# Patient Record
Sex: MALE | Race: WHITE | NOT HISPANIC OR LATINO | Employment: FULL TIME | URBAN - METROPOLITAN AREA
[De-identification: names, ages, dates, MRNs, and addresses within clinical notes are randomized per-mention and may not be internally consistent; named-entity substitution may affect disease eponyms.]

---

## 2018-01-23 ENCOUNTER — OFFICE VISIT (OUTPATIENT)
Dept: ORTHOPEDICS | Facility: CLINIC | Age: 33
End: 2018-01-23
Payer: COMMERCIAL

## 2018-01-23 ENCOUNTER — RADIANT APPOINTMENT (OUTPATIENT)
Dept: GENERAL RADIOLOGY | Facility: CLINIC | Age: 33
End: 2018-01-23
Attending: FAMILY MEDICINE
Payer: COMMERCIAL

## 2018-01-23 VITALS
HEIGHT: 66 IN | WEIGHT: 173.5 LBS | DIASTOLIC BLOOD PRESSURE: 92 MMHG | BODY MASS INDEX: 27.88 KG/M2 | HEART RATE: 114 BPM | SYSTOLIC BLOOD PRESSURE: 157 MMHG

## 2018-01-23 DIAGNOSIS — M54.5 ACUTE MIDLINE LOW BACK PAIN, WITH SCIATICA PRESENCE UNSPECIFIED: Primary | ICD-10-CM

## 2018-01-23 DIAGNOSIS — M54.5 ACUTE MIDLINE LOW BACK PAIN, WITH SCIATICA PRESENCE UNSPECIFIED: ICD-10-CM

## 2018-01-23 RX ORDER — DICLOFENAC SODIUM 75 MG/1
75 TABLET, DELAYED RELEASE ORAL 2 TIMES DAILY
Qty: 20 TABLET | Refills: 0 | Status: SHIPPED | OUTPATIENT
Start: 2018-01-23 | End: 2020-02-07

## 2018-01-23 NOTE — LETTER
Date:January 30, 2018      Patient was self referred, no letter generated. Do not send.        [facility] Health Information

## 2018-01-23 NOTE — LETTER
1/23/2018       RE: Gregory Gonsalez  [address]  [place] [address]     Dear Colleague,    Thank you for referring your patient, Gregory Gonsalez, to the [facility] SPORTS AND ORTHOPAEDIC WALK IN CLINIC at [facility]. Please see a copy of my visit note below.         NEW PATIENT INTAKE QUESTIONNAIRE  SPORTS & ORTHOPEDIC WALK-IN 1/23/2018    Primary Care Physician: None     Where are the majority of your medical records? NA    Reason for Visit:    What part of your body is injured / painful?  central low back    What caused the injury /pain? Aggravated while shoveling     How long ago did your injury occur or pain begin? yesterday    What are your most bothersome symptoms? Pain    How would you characterize your symptom? sharp and shooting    What makes your symptoms better? Nothing    What makes your symptoms worse? Standing, Walking, Movement and Bending    Have you been previously seen for this problem? NA    Medical History:    Medical History:     Have you had surgery on this body part before? No    Medications: Excedrin PRN    Allergies: No known drug allergies    Family History of Medical Problems:     Previous Surgeries: Tooth abstraction     Social History:    Occupation:  for [employer]     Handedness: Right    Exercise: Running     Review of Systems:    Have you recently had a a fever, chills, weight loss? No    Do you have any vision problems? No    Do you have any chest pain or edema? Yes, random/intermittent     Do you have any shortness of breath or wheezing?  Yes, Asthma     Do you have stomach problems? No    Do you have any numbness or focal weakness? Yes--occasionally around the right achilles     Do you have diabetes? No    Do you have problems with bleeding or clotting? No    Do you have an rashes or other skin lesions? No    Communication:    How did you hear about us? Insurance    Who else should know about this visit? Other:  NA           CHIEF COMPLAINT:  Consult (Low back )       HISTORY OF PRESENT ILLNESS  Mr. Gonsalez is a pleasant 32 year old year old male who presents to clinic today with low back pain.  Gregory explains that he was outside shoveling his driveway yesterday and experienced sharp pain of his low back.  He recalls lifting the heavy snow which was difficult for him.  His pain is described as sharp and shooting.  Location is in the central lumbar region.  Pain is worse with standing walking and movement of his low back.  It is improved by essentially nothing.  He has tried ibuprofen which is proved to be futile.  He has not had any previous back injuries or back pain.  Denies any surgeries of his low back.  Denies numbness or tingling of lower extremities.  Denies any neurologic weakness of lower extremities.    Additional history: as documented    MEDICAL HISTORY  There is no problem list on file for this patient.      Current Outpatient Prescriptions   Medication Sig Dispense Refill     Aspirin-Acetaminophen-Caffeine (EXCEDRIN PO) Take by mouth as needed       diclofenac (VOLTAREN) 75 MG EC tablet Take 1 tablet (75 mg) by mouth 2 times daily 20 tablet 0     tiZANidine (ZANAFLEX) 4 MG tablet Take 1 tablet (4 mg) by mouth 3 times daily 14 tablet 0       Allergies   Allergen Reactions     Mold      Pollen Extract      Seasonal Allergies        Medical History:    Medical History:     Have you had surgery on this body part before? No    Medications: Excedrin PRN    Allergies: No known drug allergies    Family History of Medical Problems:     Previous Surgeries: Tooth abstraction      Social History:    Occupation:  for [employer]     Handedness: Right    Exercise: Running    Additional medical/Social/Surgical histories reviewed in 2C2P and updated as appropriate.     REVIEW OF SYSTEMS (1/27/2018)  CONSTITUTIONAL: Denies fever and weight loss  EYES: Denies acute vision changes  ENT: Denies hearing changes or  "difficulty swallowing  CARDIAC: Denies chest pain or edema currently  RESPIRATORY: Denies dyspnea, cough or wheeze at this time. +asthma  GASTROINTESTINAL: Denies abdominal pain, nausea, vomiting  MUSCULOSKELETAL: See HPI  SKIN: Denies any recent rash or lesion  NEUROLOGICAL: Denies numbness or focal weakness  PSYCHIATRIC: No history of psychiatric symptoms or problems  ENDOCRINE: Denies current diagnosis of diabetes  HEMATOLOGY: Denies episodes of easy bleeding      PHYSICAL EXAM  BP (!) 157/92  Pulse 114  Ht 1.664 m (5' 5.5\")  Wt 78.7 kg (173 lb 8 oz)  BMI 28.43 kg/m2      General Appearance: Well appearing, alert, in no acute distress, well-hydrated, and well nourished  Cardiovascular: no signs of upper or lower extremity edema  Respiratory: no respiratory distress, no audible wheezing, no labored breathing, symmetric thoracic excursion  Psychiatric: mood and affect are appropriate, patient is oriented to time, place and person  General  - normal appearance, in no obvious distress  CV  - normal peripheral perfusion  Pulm  - normal respiratory pattern, non-labored  Musculoskeletal - lumbar spine  - stance: slow to rise and sit  - inspection: normal bone and joint alignment, no obvious scoliosis  - palpation: bilateral lumbar paravertebral spasm  - ROM: pain with bilateral rotation, flexion past 30 deg, extension  - strength: lower extremities 5/5 in all planes  - special tests:  (-) straight leg raise bilaterally  (-) slump test  Neuro  - patellar and Achilles DTRs 2+ bilaterally, no sensory or motor deficit, grossly normal coordination, normal muscle tone  Skin  - no ecchymosis, erythema, warmth, or induration, no obvious rash  Psych  - interactive, appropriate, normal mood and affect    IMAGING : XR lumbar 2v. Final results and radiologist's interpretation, available in the [facility] health record. Images were reviewed with the patient/family members in the office today. My personal interpretation of the " performed imaging is no acute osseous abnormality.     ASSESSMENT & PLAN  Mr. Gonsalez is a 32 year old year old male who presents to clinic today with central low back pain which occurred yesterday after shoveling heavy snow.  Low back pain likely secondary to muscle spasm vs. Disc bulge.  No worrisome neurologic findings or red flags.    Diagnosis:   Acute low back pain without radiculopathy    -Voltaren twice daily ×7 days  -Tizanidine 4 mg tablet nightly  -Home exercise program provided for stretching and light strengthening  -Physical therapy referral  -Discussed heat/ice to low back  -Follow up 2 weeks    It was a pleasure seeing Gregory today.    Peter Rosario DO, [facility]  Primary Care Sports Medicine      Again, thank you for allowing me to participate in the care of your patient.      Sincerely,    Peter Rosario DO

## 2018-01-23 NOTE — PATIENT INSTRUCTIONS
Low back pain     What is low back pain?    Low back pain is pain and stiffness in the lower back.  It is one of the most common reasons people miss work.      How does it occur?    Low back pain is usually caused when a ligament or muscle holding a vertebra in its proper position is strained.  Vertebrae are bones that make up the spinal column through which the spinal cord passes.  When these muscles or ligaments become weak, the spine loses its ability, resulting in pain.  Because nerves reach all parts of the body from the spinal cord, back problems can lead to pain or weakness in almost any part of the body.      Low back pain can occur if your job involves lifting and carrying heavy objects, or if you spend a lot of time sitting or standing in one position or bending over.  It can be caused by a fall or by unusually strenuous exercise.  It can be brought on by the tension and stress that causes headaches in some people.  It can even be brought on by violent sneezing or coughing.      People who are overweight may have low back pain because of the added stress on their back.      Back pain may occur when the muscles, joints, bones and connective tissues in the back become inflamed as a result of an infection or an immune system problem.  Arthritic disorders as well as some congenital and degenerative conditions may cause back pain.      Back pain accompanied by loss of bladder or bowel control, difficulty moving your legs, or numbness or tingling in your arms or legs may indicate an injury to your spine and nerves, which requires immediate medical treatment.      What are the symptoms?    Symptoms include:      Pain in the back or legs    Stiffness and limited motion    The pain may be continuous or may occur in certain positions.  It may be aggravated by coughing, sneezing, bending, twisting, or straining during a bowel movement. The pain may occur in only one spot or may spread to other areas, most commonly  down the buttocks and into the back of the thigh.     A low back strain typically does not produce pain past the knee into the calf or foot.  Tingling and numbness in the calf or foot may indicate a herniated disk or pinched nerve.      How is it diagnosed?    Your healthcare provider will review your medical history and examine you.  He or she may order X-rays.  In certain situations, a myelogram, CT scan, or MRI may be ordered.    How is it treated?    The early stages of back pain with muscle spasms should be treated with ice packs for 20-30 minutes every 4 to 6 hours for the first 2 to 3 days. You m ay lie on a frozen gel pack, crushed ice, or a bag of frozen peas.    The following are always to treat low back pain:      After the initial injury, applying heat from a heating pad or hot water bottle    Resting in bed on a firm mattress.  Often it helps to lie on your back with your knees raised.  However, isome people prefer to lie on their side with their knees bent.      Taking aspirin, ibuprofen, or other anti-inflammatory medications; muscle relaxants; or other pain medications if recommended by your healthcare provider (adults aged 65 years and older should not take non-steroidal anti-inflammatory medicine for more than 7 days without their healthcare provider's approval).    Having your back massaged by a trained person    Having traction, if recommended by your provider    Wearing a belt or corset to support your back    Talking with a counselor, if your back pain is related to tension caused by emotional problems.    Beginning a program of physical therapy, or exercising on your own.  Begin a regular exercise program to gently stretch and strengthen your muscles as soon as you can.  Your healthcare provider or physical therapist can recommend exercises that will not only help you feel better but will strengthen your muscles and help avoid back trouble later.      When the pain subsides, ask your  healthcare provider about starting an exercise program such as the following:       Exercise moderately every day, using stretching and warm-up exercises suggested by your provider or physical therapist.    Exercise vigorously for about 30 minutes two or three times a week by walking, swimming, using a stationary bicycle, or doing low-impact aerobics.    Participating regularly in an exercise program will not only help your back, it will also help keep you healthier overall.     How long will the effects last?      The effects of back pain last as long as the cause exists or until your body recovers from the strain, usually a day or two but sometimes weeks.     How can I take care of myself?    In addition to the treatment described above, keep in mind these suggestions:      Use an electric heating pad on a low setting (or a hot water bottle wrapped in a towel to avoid burning yourself) for 20 to 30 minutes.  Don't let the heating pad get too hot, and don't fall asleep with it.  You could get a burn.     Try putting an ice pack wrapped in a towel on your back for 20 minutes, one to four times a day.  Set an alarm to avoid frostbite from using the ice pack too long.    Put a pillow under your knees when you are lying down.    Sleep without a pillow under your head.    Lose weight if you are overweight.    Practice good posture.  Stand with your head up, shoulders straight and chest forward, weight balanced evenly on both feet, and pelvis tucked in.     Pain is the best way to  the pace you should set in increasing your activity and exercise.  Minor discomfort, stiffness, soreness, and mild aches need not interfere with activity.  However, limit your activity temporarily if:      your symptoms return    the pain increases when you are more active    the pain increases within 24 hours after a new or higher level of activity    When can I return to my sport or activity?    The goal of rehabilitation is to return  you to your sport or activity as soon as safely possible. If you return too soon you may worsen your injury, which could lead to permanent damage.  Everyone recovers from injury at different rate.  Return to your sport will be determined by how soon your back recovers, not by how many days or weeks it has been since your injury occurred.  In general, the longer you have symptoms before you start treatment, the longer it will take to get better.      It is important that you have fully recovered from your low back pain before you return to your sport or any strenuous activity.  You must be able to have the same range of motion that you had before your injury.  You must be able to run, jump and twist without pain.      What can I do to help prevent low back pain?    You can reduce the strain on your back by doing the following:      Don't push with your arms when you move a heavy object.  Turn around and push backwards so the strain is taken by your legs.     Whenever you sit, sit in a straight-backed chair and hold your spine against the back of the chair.     Bend your knees and hips and keep your back straight when you lift a heavy object.     Avoid lifting heavy objects higher than your waist    Hold packages you carry close to your body, with your arms bent.    Use a footrest for one foot when you stand or sit in one spot for a long time.  This keeps your back straight.    Bend your knees when you bend over.    Sit close to the pedals when you drive and use your seat belt and a hard backrest or pillow.     Lie on your side with your knees bent when you sleep or rest.  It may help to put a pillow between your knees.    Put a pillow under your knees when you sleep on your back.    Raise the foot of the bed 8 inches to discourage sleeping on your stomach unless you have other problems that require that you keep your head elevated.      To rest your back, hold each of these positions for 5 minutes or longer:  Lie on  your back, bend your knees, and put pillows under your knees.    Lie on your back, put a pillow under your neck, bend your knees to a 90-degree angle, and put your lower legs and feet on a chair.    Lie on your back, bend your knees, and bring one knee up to your chest and hold it there.  Repeat with the other knee, then bring both knees to your chest.  When holding your knee to your chest, grab your thigh rather than your lower leg to avoid over flexing your knee.           Exercises that stretch and strengthen the muscles of your abdomen and spine can help prevent back problems. Strong back and abdominal muscles help you keep good posture, with your spine in its correct position.    If your muscles are tight, take a warm shower or bath before doing the exercises. Exercise on a rug or mat. Wear loose clothing. Don t wear shoes. Stop doing any exercise that causes pain until you have talked with your healthcare provider.    Ask your provider or physical therapist to help you develop an exercise program. Ask your provider how many times a week you need to do the exercises. Remember to start slowly.    Excerpts from: The Sports Medicine Patient Advisor 3rd edition. Copyright 2010 [employer].     Low Back Pain Exercises    EXERCISES  These exercises are intended only as suggestions. Be sure to check with your provider before starting the exercises.    Standing hamstring stretch: Put the heel of one leg on a stool about 15 inches high. Keep your leg straight. Lean forward, bending at the hips until you feel a mild stretch in the back of your thigh. Make sure you do not roll your shoulders or bend at the waist when doing this. You want to stretch your leg, not your lower back. Hold the stretch for 15 to 30 seconds. Repeat with each leg 3 times.    Cat and camel: Get down on your hands and knees. Let your stomach sag, allowing your back to curve downward. Hold this position for 5 seconds. Then arch your back and  hold for 5 seconds. Do 2 sets of 15.    Quadruped arm and leg raise: Get down on your hands and knees. Pull in your belly button and tighten your abdominal muscles to stiffen your spine. While keeping your abdominals tight, raise one arm and the opposite leg away from you. Hold this position for 5 seconds. Lower your arm and leg slowly and change sides. Do this 10 times on each side.    Pelvic tilt: Lie on your back with your knees bent and your feet flat on the floor. Pull your belly button in towards your spine and push your lower back into the floor, flattening your back. Hold this position for 15 seconds, then relax. Repeat 5 to 10 times.  Partial curl: Lie on your back with your knees bent and your feet flat on the floor. Draw in your abdomen and tighten your stomach muscles. With your hands stretched out in front of you, curl your upper body forward until your shoulders clear the floor. Hold this position for 3 seconds. Don't hold your breath. It helps to breathe out as you lift your shoulders. Relax back to the floor. Repeat 10 times. Build to 2 sets of 15. To challenge yourself, clasp your hands behind your head and keep your elbows out to your sides.    Gluteal stretch: Lie on your back with both knees bent. Rest your right ankle over the knee of your left leg. Grasp the thigh of the left leg and pull toward your chest. You will feel a stretch along the buttocks and possibly along the outside of your hip. Hold the stretch for 15 to 30 seconds. Then repeat the exercise with your left ankle over your right knee. Do the exercise 3 times with each leg.    Extension exercise  Lie face down on the floor for 5 minutes. If this hurts too much, lie face down with a pillow under your stomach. This should relieve your leg or back pain. When you can lie on your stomach for 5 minutes without a pillow, you can continue with Part B of this exercise.    After lying on your stomach for 5 minutes, prop yourself up on your  elbows for another 5 minutes. If you can do this without having more leg or buttock pain, you can start doing part C of this exercise.    Lie on your stomach with your hands under your shoulders. Then press down on your hands and extend your elbows while keeping your hips flat on the floor. Hold for 1 second and lower yourself to the floor. Do 3 to 5 sets of 10 repetitions. Rest for 1 minute between sets. You should have no pain in your legs when you do this, but it is normal to feel some pain in your lower back.    Do this exercise several times a day.    Side plank: Lie on your side with your legs, hips, and shoulders in a straight line. Prop yourself up onto your forearm with your elbow directly under your shoulder. Lift your hips off the floor and balance on your forearm and the outside of your foot. Try to hold this position for 15 seconds and then slowly lower your hip to the ground. Switch sides and repeat. Work up to holding for 1 minute. This exercise can be made easier by starting with your knees and hips flexed toward your chest.    EXERCISES TO AVOID     It s best to avoid the following exercises because they strain the lower back:    Exercises in which you lie on your back and raise and lower both legs together    Full sit-ups or sit-ups with straight legs    Hip twists    SPORTS AND OTHER ACTIVITIES    In addition to strengthening your back muscles, it s helpful to keep your entire body in shape. Good activities for people with back problems include:      Walking    Bicycling    Swimming    Cross-country skiing    Yoga    Ruben Chi    Pilates    Some sports can hurt your back because of rough contact, twisting, sudden impact, or direct stress on your back. Sports that may be dangerous to your back include:      Football    Soccer    Volleyball    Handball    Golf    Weight lifting    Trampoline    Tobogganing    Sledding    Snowmobiling    Snowboarding    Ice hockey

## 2018-01-23 NOTE — MR AVS SNAPSHOT
After Visit Summary   1/23/2018    Gregory Gonsalez    MRN: [id number]           Patient Information     Date Of Birth          1985        Visit Information        Provider Department      1/23/2018 2:10 PM Peter Rosario DO [facility]        Today's Diagnoses     Acute midline low back pain, with sciatica presence unspecified    -  1      Care Instructions    Low back pain     What is low back pain?    Low back pain is pain and stiffness in the lower back.  It is one of the most common reasons people miss work.      How does it occur?    Low back pain is usually caused when a ligament or muscle holding a vertebra in its proper position is strained.  Vertebrae are bones that make up the spinal column through which the spinal cord passes.  When these muscles or ligaments become weak, the spine loses its ability, resulting in pain.  Because nerves reach all parts of the body from the spinal cord, back problems can lead to pain or weakness in almost any part of the body.      Low back pain can occur if your job involves lifting and carrying heavy objects, or if you spend a lot of time sitting or standing in one position or bending over.  It can be caused by a fall or by unusually strenuous exercise.  It can be brought on by the tension and stress that causes headaches in some people.  It can even be brought on by violent sneezing or coughing.      People who are overweight may have low back pain because of the added stress on their back.      Back pain may occur when the muscles, joints, bones and connective tissues in the back become inflamed as a result of an infection or an immune system problem.  Arthritic disorders as well as some congenital and degenerative conditions may cause back pain.      Back pain accompanied by loss of bladder or bowel control, difficulty moving your legs, or numbness or tingling in your arms or legs may indicate an injury to your  spine and nerves, which requires immediate medical treatment.      What are the symptoms?    Symptoms include:      Pain in the back or legs    Stiffness and limited motion    The pain may be continuous or may occur in certain positions.  It may be aggravated by coughing, sneezing, bending, twisting, or straining during a bowel movement. The pain may occur in only one spot or may spread to other areas, most commonly down the buttocks and into the back of the thigh.     A low back strain typically does not produce pain past the knee into the calf or foot.  Tingling and numbness in the calf or foot may indicate a herniated disk or pinched nerve.      How is it diagnosed?    Your healthcare provider will review your medical history and examine you.  He or she may order X-rays.  In certain situations, a myelogram, CT scan, or MRI may be ordered.    How is it treated?    The early stages of back pain with muscle spasms should be treated with ice packs for 20-30 minutes every 4 to 6 hours for the first 2 to 3 days. You m ay lie on a frozen gel pack, crushed ice, or a bag of frozen peas.    The following are always to treat low back pain:      After the initial injury, applying heat from a heating pad or hot water bottle    Resting in bed on a firm mattress.  Often it helps to lie on your back with your knees raised.  However, isome people prefer to lie on their side with their knees bent.      Taking aspirin, ibuprofen, or other anti-inflammatory medications; muscle relaxants; or other pain medications if recommended by your healthcare provider (adults aged 65 years and older should not take non-steroidal anti-inflammatory medicine for more than 7 days without their healthcare provider's approval).    Having your back massaged by a trained person    Having traction, if recommended by your provider    Wearing a belt or corset to support your back    Talking with a counselor, if your back pain is related to tension caused  by emotional problems.    Beginning a program of physical therapy, or exercising on your own.  Begin a regular exercise program to gently stretch and strengthen your muscles as soon as you can.  Your healthcare provider or physical therapist can recommend exercises that will not only help you feel better but will strengthen your muscles and help avoid back trouble later.      When the pain subsides, ask your healthcare provider about starting an exercise program such as the following:       Exercise moderately every day, using stretching and warm-up exercises suggested by your provider or physical therapist.    Exercise vigorously for about 30 minutes two or three times a week by walking, swimming, using a stationary bicycle, or doing low-impact aerobics.    Participating regularly in an exercise program will not only help your back, it will also help keep you healthier overall.     How long will the effects last?      The effects of back pain last as long as the cause exists or until your body recovers from the strain, usually a day or two but sometimes weeks.     How can I take care of myself?    In addition to the treatment described above, keep in mind these suggestions:      Use an electric heating pad on a low setting (or a hot water bottle wrapped in a towel to avoid burning yourself) for 20 to 30 minutes.  Don't let the heating pad get too hot, and don't fall asleep with it.  You could get a burn.     Try putting an ice pack wrapped in a towel on your back for 20 minutes, one to four times a day.  Set an alarm to avoid frostbite from using the ice pack too long.    Put a pillow under your knees when you are lying down.    Sleep without a pillow under your head.    Lose weight if you are overweight.    Practice good posture.  Stand with your head up, shoulders straight and chest forward, weight balanced evenly on both feet, and pelvis tucked in.     Pain is the best way to  the pace you should set in  increasing your activity and exercise.  Minor discomfort, stiffness, soreness, and mild aches need not interfere with activity.  However, limit your activity temporarily if:      your symptoms return    the pain increases when you are more active    the pain increases within 24 hours after a new or higher level of activity    When can I return to my sport or activity?    The goal of rehabilitation is to return you to your sport or activity as soon as safely possible. If you return too soon you may worsen your injury, which could lead to permanent damage.  Everyone recovers from injury at different rate.  Return to your sport will be determined by how soon your back recovers, not by how many days or weeks it has been since your injury occurred.  In general, the longer you have symptoms before you start treatment, the longer it will take to get better.      It is important that you have fully recovered from your low back pain before you return to your sport or any strenuous activity.  You must be able to have the same range of motion that you had before your injury.  You must be able to run, jump and twist without pain.      What can I do to help prevent low back pain?    You can reduce the strain on your back by doing the following:      Don't push with your arms when you move a heavy object.  Turn around and push backwards so the strain is taken by your legs.     Whenever you sit, sit in a straight-backed chair and hold your spine against the back of the chair.     Bend your knees and hips and keep your back straight when you lift a heavy object.     Avoid lifting heavy objects higher than your waist    Hold packages you carry close to your body, with your arms bent.    Use a footrest for one foot when you stand or sit in one spot for a long time.  This keeps your back straight.    Bend your knees when you bend over.    Sit close to the pedals when you drive and use your seat belt and a hard backrest or pillow.      Lie on your side with your knees bent when you sleep or rest.  It may help to put a pillow between your knees.    Put a pillow under your knees when you sleep on your back.    Raise the foot of the bed 8 inches to discourage sleeping on your stomach unless you have other problems that require that you keep your head elevated.      To rest your back, hold each of these positions for 5 minutes or longer:  Lie on your back, bend your knees, and put pillows under your knees.    Lie on your back, put a pillow under your neck, bend your knees to a 90-degree angle, and put your lower legs and feet on a chair.    Lie on your back, bend your knees, and bring one knee up to your chest and hold it there.  Repeat with the other knee, then bring both knees to your chest.  When holding your knee to your chest, grab your thigh rather than your lower leg to avoid over flexing your knee.           Exercises that stretch and strengthen the muscles of your abdomen and spine can help prevent back problems. Strong back and abdominal muscles help you keep good posture, with your spine in its correct position.    If your muscles are tight, take a warm shower or bath before doing the exercises. Exercise on a rug or mat. Wear loose clothing. Don t wear shoes. Stop doing any exercise that causes pain until you have talked with your healthcare provider.    Ask your provider or physical therapist to help you develop an exercise program. Ask your provider how many times a week you need to do the exercises. Remember to start slowly.    Excerpts from: The Sports Medicine Patient Advisor 3rd edition. Copyright 2010 [employer].     Low Back Pain Exercises    EXERCISES  These exercises are intended only as suggestions. Be sure to check with your provider before starting the exercises.    Standing hamstring stretch: Put the heel of one leg on a stool about 15 inches high. Keep your leg straight. Lean forward, bending at the hips until  you feel a mild stretch in the back of your thigh. Make sure you do not roll your shoulders or bend at the waist when doing this. You want to stretch your leg, not your lower back. Hold the stretch for 15 to 30 seconds. Repeat with each leg 3 times.    Cat and camel: Get down on your hands and knees. Let your stomach sag, allowing your back to curve downward. Hold this position for 5 seconds. Then arch your back and hold for 5 seconds. Do 2 sets of 15.    Quadruped arm and leg raise: Get down on your hands and knees. Pull in your belly button and tighten your abdominal muscles to stiffen your spine. While keeping your abdominals tight, raise one arm and the opposite leg away from you. Hold this position for 5 seconds. Lower your arm and leg slowly and change sides. Do this 10 times on each side.    Pelvic tilt: Lie on your back with your knees bent and your feet flat on the floor. Pull your belly button in towards your spine and push your lower back into the floor, flattening your back. Hold this position for 15 seconds, then relax. Repeat 5 to 10 times.  Partial curl: Lie on your back with your knees bent and your feet flat on the floor. Draw in your abdomen and tighten your stomach muscles. With your hands stretched out in front of you, curl your upper body forward until your shoulders clear the floor. Hold this position for 3 seconds. Don't hold your breath. It helps to breathe out as you lift your shoulders. Relax back to the floor. Repeat 10 times. Build to 2 sets of 15. To challenge yourself, clasp your hands behind your head and keep your elbows out to your sides.    Gluteal stretch: Lie on your back with both knees bent. Rest your right ankle over the knee of your left leg. Grasp the thigh of the left leg and pull toward your chest. You will feel a stretch along the buttocks and possibly along the outside of your hip. Hold the stretch for 15 to 30 seconds. Then repeat the exercise with your left ankle over  your right knee. Do the exercise 3 times with each leg.    Extension exercise  Lie face down on the floor for 5 minutes. If this hurts too much, lie face down with a pillow under your stomach. This should relieve your leg or back pain. When you can lie on your stomach for 5 minutes without a pillow, you can continue with Part B of this exercise.    After lying on your stomach for 5 minutes, prop yourself up on your elbows for another 5 minutes. If you can do this without having more leg or buttock pain, you can start doing part C of this exercise.    Lie on your stomach with your hands under your shoulders. Then press down on your hands and extend your elbows while keeping your hips flat on the floor. Hold for 1 second and lower yourself to the floor. Do 3 to 5 sets of 10 repetitions. Rest for 1 minute between sets. You should have no pain in your legs when you do this, but it is normal to feel some pain in your lower back.    Do this exercise several times a day.    Side plank: Lie on your side with your legs, hips, and shoulders in a straight line. Prop yourself up onto your forearm with your elbow directly under your shoulder. Lift your hips off the floor and balance on your forearm and the outside of your foot. Try to hold this position for 15 seconds and then slowly lower your hip to the ground. Switch sides and repeat. Work up to holding for 1 minute. This exercise can be made easier by starting with your knees and hips flexed toward your chest.    EXERCISES TO AVOID     It s best to avoid the following exercises because they strain the lower back:    Exercises in which you lie on your back and raise and lower both legs together    Full sit-ups or sit-ups with straight legs    Hip twists    SPORTS AND OTHER ACTIVITIES    In addition to strengthening your back muscles, it s helpful to keep your entire body in shape. Good activities for people with back problems  include:      Walking    Bicycling    Swimming    Cross-country skiing    Yoga    Ruben Chi    Pilates    Some sports can hurt your back because of rough contact, twisting, sudden impact, or direct stress on your back. Sports that may be dangerous to your back include:      Football    Soccer    Volleyball    Handball    Golf    Weight lifting    Trampoline    Tobogganing    Sledding    Snowmobiling    Snowboarding    Ice hockey              Follow-ups after your visit        Additional Services     PHYSICAL THERAPY REFERRAL (External-Prints)       Physical Therapy Referral                  Who to contact     Please call your clinic at [phone] to:    Ask questions about your health    Make or cancel appointments    Discuss your medicines    Learn about your test results    Speak to your doctor   If you have compliments or concerns about an experience at your clinic, or if you wish to file a complaint, please contact [facility] Patient Relations at [phone] or email us at [email or web address]         Additional Information About Your Visit        SHADOWharBitdeli Information     lark is an electronic gateway that provides easy, online access to your medical records. With lark, you can request a clinic appointment, read your test results, renew a prescription or communicate with your care team.     To sign up for lark visit the website at [email or web address]   You will be asked to enter the access code listed below, as well as some personal information. Please follow the directions to create your username and password.     Your access code is: [email or web address]  Expires: 2018 11:06 AM     Your access code will  in 90 days. If you need help or a new code, please contact your [facility] or call [phone] for assistance.        Care EveryWhere ID     This is your Care EveryWhere ID. This could be used by other organizations to  "access your [facility] medical records  [id number]        Your Vitals Were     Pulse Height BMI (Body Mass Index)             114 1.664 m (5' 5.5\") 28.43 kg/m2          Blood Pressure from Last 3 Encounters:   01/23/18 (!) 157/92    Weight from Last 3 Encounters:   01/23/18 78.7 kg (173 lb 8 oz)              We Performed the Following     PHYSICAL THERAPY REFERRAL (External-Prints)          Today's Medication Changes          These changes are accurate as of: 1/23/18  3:11 PM.  If you have any questions, ask your nurse or doctor.               Start taking these medicines.        Dose/Directions    diclofenac 75 MG EC tablet   Commonly known as:  VOLTAREN   Used for:  Acute midline low back pain, with sciatica presence unspecified   Started by:  Peter Rosario DO        Dose:  75 mg   Take 1 tablet (75 mg) by mouth 2 times daily   Quantity:  20 tablet   Refills:  0       tiZANidine 4 MG tablet   Commonly known as:  ZANAFLEX   Used for:  Acute midline low back pain, with sciatica presence unspecified   Started by:  Peter Rosario DO        Dose:  4 mg   Take 1 tablet (4 mg) by mouth 3 times daily   Quantity:  14 tablet   Refills:  0            Where to get your medicines      These medications were sent to [facility] [address] [place], [place] - [address] [place] [address]  [address] [place] [address]     Phone:  [phone]     diclofenac 75 MG EC tablet    tiZANidine 4 MG tablet                Primary Care Provider Fax #    Physician No Ref-Primary [phone]       No address on file        Equal Access to Services     ROSALBA UPTON : Hadii aad ku hadasho Soomaali, waaxda luqadaha, qaybta kaalmada adeegyada, ramandeep marcelino . So [facility] [phone].    ATENCIÓN: Si habla español, tiene a coreas disposición servicios gratuitos de asistencia lingüística. Llame al [phone].    We comply with applicable federal civil rights laws and [place] laws. We do not discriminate on the " basis of race, color, national origin, age, disability, sex, sexual orientation, or gender identity.            Thank you!     Thank you for choosing [facility]  for your care. Our goal is always to provide you with excellent care. Hearing back from our patients is one way we can continue to improve our services. Please take a few minutes to complete the written survey that you may receive in the mail after your visit with us. Thank you!             Your Updated Medication List - Protect others around you: Learn how to safely use, store and throw away your medicines at www.disposemymeds.org.          This list is accurate as of: 1/23/18  3:11 PM.  Always use your most recent med list.                   Brand Name Dispense Instructions for use Diagnosis    diclofenac 75 MG EC tablet    VOLTAREN    20 tablet    Take 1 tablet (75 mg) by mouth 2 times daily    Acute midline low back pain, with sciatica presence unspecified       EXCEDRIN PO      Take by mouth as needed        tiZANidine 4 MG tablet    ZANAFLEX    14 tablet    Take 1 tablet (4 mg) by mouth 3 times daily    Acute midline low back pain, with sciatica presence unspecified

## 2018-01-23 NOTE — LETTER
[facility]  [address] [address]        Employee Name: Gregory Gonsalez      : 1985     SS#: (Not on file)      To whom it may concern,      I have had the pleasure of caring for Gregory today regarding injury suffered on 18.  Please excuse him from work between  - 18 as he recovers from injury.  If you have additional questions, please call my office at [phone].  Thank you.      Best regards,        Peter Rosario,  [facility]  Primary Care Sports Medicine

## 2018-01-23 NOTE — PROGRESS NOTES
NEW PATIENT INTAKE QUESTIONNAIRE  SPORTS & ORTHOPEDIC WALK-IN 1/23/2018    Primary Care Physician: None     Where are the majority of your medical records? NA    Reason for Visit:    What part of your body is injured / painful?  central low back    What caused the injury /pain? Aggravated while shoveling     How long ago did your injury occur or pain begin? yesterday    What are your most bothersome symptoms? Pain    How would you characterize your symptom? sharp and shooting    What makes your symptoms better? Nothing    What makes your symptoms worse? Standing, Walking, Movement and Bending    Have you been previously seen for this problem? NA    Medical History:    Medical History:     Have you had surgery on this body part before? No    Medications: Excedrin PRN    Allergies: No known drug allergies    Family History of Medical Problems:     Previous Surgeries: Tooth abstraction     Social History:    Occupation:  for [employer]     Handedness: Right    Exercise: Running     Review of Systems:    Have you recently had a a fever, chills, weight loss? No    Do you have any vision problems? No    Do you have any chest pain or edema? Yes, random/intermittent     Do you have any shortness of breath or wheezing?  Yes, Asthma     Do you have stomach problems? No    Do you have any numbness or focal weakness? Yes--occasionally around the right achilles     Do you have diabetes? No    Do you have problems with bleeding or clotting? No    Do you have an rashes or other skin lesions? No    Communication:    How did you hear about us? Insurance    Who else should know about this visit? Other: NA

## 2018-01-27 NOTE — PROGRESS NOTES
CHIEF COMPLAINT:  Consult (Low back )       HISTORY OF PRESENT ILLNESS  Mr. Gonsalez is a pleasant 32 year old year old male who presents to clinic today with low back pain.  Gregory explains that he was outside shoveling his driveway yesterday and experienced sharp pain of his low back.  He recalls lifting the heavy snow which was difficult for him.  His pain is described as sharp and shooting.  Location is in the central lumbar region.  Pain is worse with standing walking and movement of his low back.  It is improved by essentially nothing.  He has tried ibuprofen which is proved to be futile.  He has not had any previous back injuries or back pain.  Denies any surgeries of his low back.  Denies numbness or tingling of lower extremities.  Denies any neurologic weakness of lower extremities.    Additional history: as documented    MEDICAL HISTORY  There is no problem list on file for this patient.      Current Outpatient Prescriptions   Medication Sig Dispense Refill     Aspirin-Acetaminophen-Caffeine (EXCEDRIN PO) Take by mouth as needed       diclofenac (VOLTAREN) 75 MG EC tablet Take 1 tablet (75 mg) by mouth 2 times daily 20 tablet 0     tiZANidine (ZANAFLEX) 4 MG tablet Take 1 tablet (4 mg) by mouth 3 times daily 14 tablet 0       Allergies   Allergen Reactions     Mold      Pollen Extract      Seasonal Allergies        Medical History:    Medical History:     Have you had surgery on this body part before? No    Medications: Excedrin PRN    Allergies: No known drug allergies    Family History of Medical Problems:     Previous Surgeries: Tooth abstraction      Social History:    Occupation:  for [employer]     Handedness: Right    Exercise: Running    Additional medical/Social/Surgical histories reviewed in [facility] and updated as appropriate.     REVIEW OF SYSTEMS (1/27/2018)  CONSTITUTIONAL: Denies fever and weight loss  EYES: Denies acute vision changes  ENT: Denies hearing changes or difficulty  "swallowing  CARDIAC: Denies chest pain or edema currently  RESPIRATORY: Denies dyspnea, cough or wheeze at this time. +asthma  GASTROINTESTINAL: Denies abdominal pain, nausea, vomiting  MUSCULOSKELETAL: See HPI  SKIN: Denies any recent rash or lesion  NEUROLOGICAL: Denies numbness or focal weakness  PSYCHIATRIC: No history of psychiatric symptoms or problems  ENDOCRINE: Denies current diagnosis of diabetes  HEMATOLOGY: Denies episodes of easy bleeding      PHYSICAL EXAM  BP (!) 157/92  Pulse 114  Ht 1.664 m (5' 5.5\")  Wt 78.7 kg (173 lb 8 oz)  BMI 28.43 kg/m2      General Appearance: Well appearing, alert, in no acute distress, well-hydrated, and well nourished  Cardiovascular: no signs of upper or lower extremity edema  Respiratory: no respiratory distress, no audible wheezing, no labored breathing, symmetric thoracic excursion  Psychiatric: mood and affect are appropriate, patient is oriented to time, place and person  General  - normal appearance, in no obvious distress  CV  - normal peripheral perfusion  Pulm  - normal respiratory pattern, non-labored  Musculoskeletal - lumbar spine  - stance: slow to rise and sit  - inspection: normal bone and joint alignment, no obvious scoliosis  - palpation: bilateral lumbar paravertebral spasm  - ROM: pain with bilateral rotation, flexion past 30 deg, extension  - strength: lower extremities 5/5 in all planes  - special tests:  (-) straight leg raise bilaterally  (-) slump test  Neuro  - patellar and Achilles DTRs 2+ bilaterally, no sensory or motor deficit, grossly normal coordination, normal muscle tone  Skin  - no ecchymosis, erythema, warmth, or induration, no obvious rash  Psych  - interactive, appropriate, normal mood and affect    IMAGING : XR lumbar 2v. Final results and radiologist's interpretation, available in the [facility] health record. Images were reviewed with the patient/family members in the office today. My personal interpretation of the performed imaging " is no acute osseous abnormality.     ASSESSMENT & PLAN  Mr. Gonsalez is a 32 year old year old male who presents to clinic today with central low back pain which occurred yesterday after shoveling heavy snow.  Low back pain likely secondary to muscle spasm vs. Disc bulge.  No worrisome neurologic findings or red flags.    Diagnosis:   Acute low back pain without radiculopathy    -Voltaren twice daily ×7 days  -Tizanidine 4 mg tablet nightly  -Home exercise program provided for stretching and light strengthening  -Physical therapy referral  -Discussed heat/ice to low back  -Follow up 2 weeks    It was a pleasure seeing Gregory today.    Peter Rosario DO, CAQSM  Primary Care Sports Medicine

## 2018-02-26 ENCOUNTER — OFFICE VISIT (OUTPATIENT)
Dept: ORTHOPEDICS | Facility: CLINIC | Age: 33
End: 2018-02-26
Payer: COMMERCIAL

## 2018-02-26 VITALS — WEIGHT: 173 LBS | BODY MASS INDEX: 27.8 KG/M2 | HEIGHT: 66 IN

## 2018-02-26 DIAGNOSIS — M54.5 ACUTE MIDLINE LOW BACK PAIN, WITH SCIATICA PRESENCE UNSPECIFIED: Primary | ICD-10-CM

## 2018-02-26 NOTE — MR AVS SNAPSHOT
"              After Visit Summary   2018    Gregory Gonsalez    MRN: [id number]           Patient Information     Date Of Birth          1985        Visit Information        Provider Department      2018 6:00 PM Peter Rosario DO [facility]        Today's Diagnoses     Acute midline low back pain, with sciatica presence unspecified    -  1       Follow-ups after your visit        Who to contact     Please call your clinic at [phone] to:    Ask questions about your health    Make or cancel appointments    Discuss your medicines    Learn about your test results    Speak to your doctor            Additional Information About Your Visit        MyChart Information     Evtron is an electronic gateway that provides easy, online access to your medical records. With Ev[employer], you can request a clinic appointment, read your test results, renew a prescription or communicate with your care team.     To sign up for Evtron visit the website at [email or web address]   You will be asked to enter the access code listed below, as well as some personal information. Please follow the directions to create your username and password.     Your access code is: [email or web address]  Expires: 2018 11:06 AM     Your access code will  in 90 days. If you need help or a new code, please contact your [facility] or call [phone] for assistance.        Care EveryWhere ID     This is your Care EveryWhere ID. This could be used by other organizations to access your [facility] medical records  [id number]        Your Vitals Were     Height BMI (Body Mass Index)                1.664 m (5' 5.5\") 28.35 kg/m2           Blood Pressure from Last 3 Encounters:   18 (!) 157/92    Weight from Last 3 Encounters:   18 78.5 kg (173 lb)   18 78.7 kg (173 lb 8 oz)              Today, you had the following     No orders found for display       " Primary Care Provider Fax #    Physician No Ref-Primary [phone]       No address on file        Equal Access to Services     ROSALBA UPTON : Hadii dae field dane Mckeon, waporfirioda lusherine, kath kashannada jaya, ramandeep pang laavejarrett delgado. So [facility] [phone].    ATENCIÓN: Si habla español, tiene a coreas disposición servicios gratuitos de asistencia lingüística. Llame al [phone].    We comply with applicable federal civil rights laws and [place] laws. We do not discriminate on the basis of race, color, national origin, age, disability, sex, sexual orientation, or gender identity.            Thank you!     Thank you for choosing [facility] AND [facility]TH[facility] W[facility] CLINIC  for your care. Our goal is always to provide you with excellent care. Hearing back from our patients is one way we can continue to improve our services. Please take a few minutes to complete the written survey that you may receive in the mail after your visit with us. Thank you!             Your Updated Medication List - Protect others around you: Learn how to safely use, store and throw away your medicines at www.disposemymeds.org.          This list is accurate as of 2/26/18 11:59 PM.  Always use your most recent med list.                   Brand Name Dispense Instructions for use Diagnosis    diclofenac 75 MG EC tablet    VOLTAREN    20 tablet    Take 1 tablet (75 mg) by mouth 2 times daily    Acute midline low back pain, with sciatica presence unspecified       EXCEDRIN PO      Take by mouth as needed        tiZANidine 4 MG tablet    ZANAFLEX    14 tablet    Take 1 tablet (4 mg) by mouth 3 times daily    Acute midline low back pain, with sciatica presence unspecified

## 2018-02-26 NOTE — LETTER
Date:March 2, 2018      Patient was self referred, no letter generated. Do not send.        [facility] Health Information

## 2018-02-26 NOTE — LETTER
2/26/2018       RE: Gregory Gonsalez  [address]  [place] [address]     Dear Colleague,    Thank you for referring your patient, Gregory Gonsalez, to the [facility]TS AND ORTHOP[facility]ED[facility] WALK [facility] CLINIC at [facility]. Please see a copy of my visit note below.          SPORTS & ORTHOPEDIC WALK-IN FOLLOW-UP VISIT 2/26/2018    Interval History:     Follow up reason: Recheck low back    Date of injury: 1/22/18    Date last seen: 1/23/18    Following Therapeutic Plan: Yes     Pain: Improving    Function: Improving    Interval History: Pt states his back doesn't hurt anymore, it's just stiff.  Hasn't done PT.     Medical History:    Any recent changes to your medical history? No    Any new medication prescribed since last visit? No    Review of Systems:    Do you have fever, chills, weight loss? No    Do you have any vision problems? No    Do you have any chest pain or edema? Yes, random/intermittent    Do you have any shortness of breath or wheezing?  Yes, asthma    Do you have stomach problems? No    Do you have any numbness or focal weakness? Yes,  - occasional numbness over right achilles    Do you have diabetes? No    Do you have problems with bleeding or clotting? No    Do you have an rashes or other skin lesions? No             ESTABLISHED PATIENT FOLLOW-UP:  RECHECK (Back pain)       HISTORY OF PRESENT ILLNESS  Mr. Gonsalez is a pleasant 32 year old year old male who presents to clinic today for follow-up of low back pain    Date of injury: 1/22/18  Date last seen: 1/23/18  Following Therapeutic Plan: Partially, has not scheduled PT  Pain: Improved greatly  Function: Improved    Interval History: Patient is doing well.  Felt immediate improvement with voltaren and zanaflex.  Pain completely resolved, however residual stiffness with deep flexion. No radicular pain, N/T.  Understands PT would be beneficial for stiffness, future occurrences.     Additional  "medical/Social/Surgical histories reviewed in [facility] and updated as appropriate.    REVIEW OF SYSTEMS (3/1/2018)  CONSTITUTIONAL: Denies fever and weight loss  GASTROINTESTINAL: Denies abdominal pain, nausea, vomiting  MUSCULOSKELETAL: See HPI  SKIN: Denies any recent rash or lesion  NEUROLOGICAL: Denies numbness or focal weakness     PHYSICAL EXAM  Ht 1.664 m (5' 5.5\")  Wt 78.5 kg (173 lb)  BMI 28.35 kg/m2    General  - normal appearance, in no obvious distress  CV  - normal peripheral perfusion  Pulm  - normal respiratory pattern, non-labored  Musculoskeletal - lumbar spine  - stance: Normal stance and gait  - inspection: normal bone and joint alignment, no obvious scoliosis  - palpation: no lumbar paravertebral spasm or tenderness  - ROM: no pain with bilateral rotation, flexion, extension or sidebending.  Mildly decreased flexion, \"tightness\" >60 degrees.  - strength: lower extremities 5/5  - special tests:  (-) straight leg raise bilaterally  (-) slump test  Neuro  - patellar and Achilles DTRs 2+ bilaterally, no sensory or motor deficit, grossly normal coordination, normal muscle tone  Skin  - no ecchymosis, erythema, warmth, or induration, no obvious rash  Psych  - interactive, appropriate, normal mood and affect    ASSESSMENT & PLAN  Mr. Gonsalez is a 32 year old year old male who presents to clinic today for follow-up of acute low back pain without radiculopathy.    -Encouraged physical therapy   -Proper lifting techniques, activity modification   -Follow up PRN    It was a pleasure seeing Gregory.    Peter Rosario DO, [facility]  Primary Care Sports Medicine          Again, thank you for allowing me to participate in the care of your patient.      Sincerely,    Peter Rosario DO      "

## 2018-02-27 NOTE — PROGRESS NOTES
SPORTS & ORTHOPEDIC WALK-IN FOLLOW-UP VISIT 2/26/2018    Interval History:     Follow up reason: Recheck low back    Date of injury: 1/22/18    Date last seen: 1/23/18    Following Therapeutic Plan: Yes     Pain: Improving    Function: Improving    Interval History: Pt states his back doesn't hurt anymore, it's just stiff.  Hasn't done PT.     Medical History:    Any recent changes to your medical history? No    Any new medication prescribed since last visit? No    Review of Systems:    Do you have fever, chills, weight loss? No    Do you have any vision problems? No    Do you have any chest pain or edema? Yes, random/intermittent    Do you have any shortness of breath or wheezing?  Yes, asthma    Do you have stomach problems? No    Do you have any numbness or focal weakness? Yes,  - occasional numbness over right achilles    Do you have diabetes? No    Do you have problems with bleeding or clotting? No    Do you have an rashes or other skin lesions? No

## 2018-03-01 NOTE — PROGRESS NOTES
"ESTABLISHED PATIENT FOLLOW-UP:  RECHECK (Back pain)       HISTORY OF PRESENT ILLNESS  Mr. Gonsalez is a pleasant 32 year old year old male who presents to clinic today for follow-up of low back pain    Date of injury: 1/22/18  Date last seen: 1/23/18  Following Therapeutic Plan: Partially, has not scheduled PT  Pain: Improved greatly  Function: Improved    Interval History: Patient is doing well.  Felt immediate improvement with voltaren and zanaflex.  Pain completely resolved, however residual stiffness with deep flexion. No radicular pain, N/T.  Understands PT would be beneficial for stiffness, future occurrences.     Additional medical/Social/Surgical histories reviewed in [facility] and updated as appropriate.    REVIEW OF SYSTEMS (3/1/2018)  CONSTITUTIONAL: Denies fever and weight loss  GASTROINTESTINAL: Denies abdominal pain, nausea, vomiting  MUSCULOSKELETAL: See HPI  SKIN: Denies any recent rash or lesion  NEUROLOGICAL: Denies numbness or focal weakness     PHYSICAL EXAM  Ht 1.664 m (5' 5.5\")  Wt 78.5 kg (173 lb)  BMI 28.35 kg/m2    General  - normal appearance, in no obvious distress  CV  - normal peripheral perfusion  Pulm  - normal respiratory pattern, non-labored  Musculoskeletal - lumbar spine  - stance: Normal stance and gait  - inspection: normal bone and joint alignment, no obvious scoliosis  - palpation: no lumbar paravertebral spasm or tenderness  - ROM: no pain with bilateral rotation, flexion, extension or sidebending.  Mildly decreased flexion, \"tightness\" >60 degrees.  - strength: lower extremities 5/5  - special tests:  (-) straight leg raise bilaterally  (-) slump test  Neuro  - patellar and Achilles DTRs 2+ bilaterally, no sensory or motor deficit, grossly normal coordination, normal muscle tone  Skin  - no ecchymosis, erythema, warmth, or induration, no obvious rash  Psych  - interactive, appropriate, normal mood and affect    ASSESSMENT & PLAN  Mr. Gonsalez is a 32 year old year old male who " presents to clinic today for follow-up of acute low back pain without radiculopathy.    -Encouraged physical therapy   -Proper lifting techniques, activity modification   -Follow up PRN    It was a pleasure seeing Mere Rosario DO, CAM  Primary Care Sports Medicine

## 2020-02-07 ENCOUNTER — OFFICE VISIT (OUTPATIENT)
Dept: URGENT CARE | Facility: URGENT CARE | Age: 35
End: 2020-02-07
Payer: COMMERCIAL

## 2020-02-07 VITALS
WEIGHT: 178 LBS | HEART RATE: 108 BPM | RESPIRATION RATE: 15 BRPM | BODY MASS INDEX: 29.17 KG/M2 | TEMPERATURE: 101.6 F | SYSTOLIC BLOOD PRESSURE: 134 MMHG | OXYGEN SATURATION: 96 % | DIASTOLIC BLOOD PRESSURE: 64 MMHG

## 2020-02-07 DIAGNOSIS — J10.1 INFLUENZA B: Primary | ICD-10-CM

## 2020-02-07 LAB
FLUAV+FLUBV AG SPEC QL: NEGATIVE
FLUAV+FLUBV AG SPEC QL: POSITIVE
SPECIMEN SOURCE: ABNORMAL

## 2020-02-07 PROCEDURE — 99213 OFFICE O/P EST LOW 20 MIN: CPT | Performed by: PHYSICIAN ASSISTANT

## 2020-02-07 PROCEDURE — 87804 INFLUENZA ASSAY W/OPTIC: CPT | Performed by: PHYSICIAN ASSISTANT

## 2020-02-07 RX ORDER — ALBUTEROL SULFATE 90 UG/1
2 AEROSOL, METERED RESPIRATORY (INHALATION) EVERY 4 HOURS PRN
Qty: 1 INHALER | Refills: 0 | Status: SHIPPED | OUTPATIENT
Start: 2020-02-07

## 2020-02-07 RX ORDER — OSELTAMIVIR PHOSPHATE 75 MG/1
75 CAPSULE ORAL 2 TIMES DAILY
Qty: 10 CAPSULE | Refills: 0 | Status: SHIPPED | OUTPATIENT
Start: 2020-02-07 | End: 2020-02-12

## 2020-02-07 NOTE — PROGRESS NOTES
SUBJECTIVE:   Gregory Gonsalez is a 34 year old male presenting with a chief complaint of fever, chills and cough - non-productive.  Onset of symptoms was 1 day(s) ago.  Course of illness is same.    Severity moderately severe  Current and Associated symptoms: as above  Treatment measures tried include cough suppressants.  Predisposing factors include Hx of asthma, did not get a flu shot this year.    No past medical history on file.  Current Outpatient Medications   Medication Sig Dispense Refill     Aspirin-Acetaminophen-Caffeine (EXCEDRIN PO) Take by mouth as needed       Social History     Tobacco Use     Smoking status: Never Smoker     Smokeless tobacco: Never Used   Substance Use Topics     Alcohol use: Not on file       ROS:  Review of systems negative except as stated above.    OBJECTIVE:  /64   Pulse 108   Temp 101.6  F (38.7  C) (Tympanic)   Resp 15   Wt 80.7 kg (178 lb)   SpO2 96%   BMI 29.17 kg/m    GENERAL APPEARANCE: healthy, alert and no distress  EYES: EOMI,  PERRL, conjunctiva clear  HENT: ear canals and TM's normal.  Nose and mouth without ulcers, erythema or lesions  NECK: supple, nontender, no lymphadenopathy  RESP: lungs clear to auscultation - no rales, rhonchi or wheezes  CV: regular rates and rhythm, normal S1 S2, no murmur noted  ABDOMEN:  soft, nontender  NEURO: Normal strength and tone, sensory exam grossly normal,  normal speech and mentation  SKIN: no suspicious lesions or rashes    Results for orders placed or performed in visit on 02/07/20   Influenza A/B antigen     Status: Abnormal   Result Value Ref Range    Influenza A/B Agn Specimen Nasal     Influenza A Negative NEG^Negative    Influenza B Positive (A) NEG^Negative       ASSESSMENT / PLAN:  1. Influenza B  Lungs are CTAB, no sign of respiratory distress. Throat without PTA or RPA and TM clear B/L. No nuchal rigidity or abd tenderness. I do not think that symptoms are representative of pneumonia, AOM, ARBS or other  bacterial etiology.    Influenza B is +  He has a history of asthma, and feels some wheezing, inhaler is . In clinic, lungs are clear.  Encouraged fluids, rest, IBU 600mg three times per day and Mucinex DM for cough  Note given for work   - Influenza A/B antigen  - oseltamivir (TAMIFLU) 75 MG capsule; Take 1 capsule (75 mg) by mouth 2 times daily for 5 days  Dispense: 10 capsule; Refill: 0  - albuterol (PROAIR HFA/PROVENTIL HFA/VENTOLIN HFA) 108 (90 Base) MCG/ACT inhaler; Inhale 2 puffs into the lungs every 4 hours as needed for shortness of breath / dyspnea or wheezing  Dispense: 1 Inhaler; Refill: 0    Diagnosis and treatment plan was reviewed with patient and/or family.   We went over any labs or imaging. Discussed worsening symptoms or little to no relief despite treatment plan to follow-up with PCP or return to clinic.  Patient verbalizes understanding. All questions were addressed and answered.   Henny Nelson PA-C

## 2020-02-07 NOTE — LETTER
[facility]  [address]  [address]  [place] [address]  Phone: [phone]  Fax: [phone]    February 7, 2020        Gregory Gonsalez  [address]  [place] [address]          To whom it may concern:    RE: Gregory Gonsalze    Patient was seen and treated today at our clinic. Please excuse from work 2/7/2020 - 2/14/2020. He may return to work sooner if symptoms resolve.     Please contact me for questions or concerns.      Sincerely,        Henny Nelson PA-C